# Patient Record
Sex: FEMALE | Race: WHITE | NOT HISPANIC OR LATINO | URBAN - METROPOLITAN AREA
[De-identification: names, ages, dates, MRNs, and addresses within clinical notes are randomized per-mention and may not be internally consistent; named-entity substitution may affect disease eponyms.]

---

## 2017-09-09 ENCOUNTER — EMERGENCY (EMERGENCY)
Facility: HOSPITAL | Age: 57
LOS: 1 days | Discharge: HOME | End: 2017-09-09
Attending: EMERGENCY MEDICINE | Admitting: EMERGENCY MEDICINE
Payer: COMMERCIAL

## 2017-09-09 VITALS
RESPIRATION RATE: 19 BRPM | HEART RATE: 83 BPM | TEMPERATURE: 98 F | OXYGEN SATURATION: 100 % | DIASTOLIC BLOOD PRESSURE: 116 MMHG | WEIGHT: 130.07 LBS | SYSTOLIC BLOOD PRESSURE: 136 MMHG

## 2017-09-09 VITALS
TEMPERATURE: 98 F | HEART RATE: 78 BPM | DIASTOLIC BLOOD PRESSURE: 56 MMHG | OXYGEN SATURATION: 100 % | RESPIRATION RATE: 18 BRPM | SYSTOLIC BLOOD PRESSURE: 124 MMHG

## 2017-09-09 PROCEDURE — 99284 EMERGENCY DEPT VISIT MOD MDM: CPT

## 2017-09-09 PROCEDURE — 71020: CPT | Mod: 26

## 2017-09-09 NOTE — ED PROVIDER NOTE - OBJECTIVE STATEMENT
*further documentation by resident on paper chart; pt was seen during downtime*  56 yr old female with PMH of PTX (last 20 years ago) *further documentation by resident on paper chart; pt was seen during downtime*  56 yr old female with PMH of PTX (last 20 years ago) presents with CP and SOB.  Denies cough, fever.  Denies trauma, excessive lifting.  Has had multiple stressors with sick family members that has caused excessive anxiety.  States some components make her feel this is like prior PTX but some do not.  No prior cardiac workup.

## 2017-09-09 NOTE — ED PROVIDER NOTE - MEDICAL DECISION MAKING DETAILS
US shows no PTX; EKG wnl; CXR pending;  has f/u with PMD and further hx taking appears largely related to stressors

## 2017-09-09 NOTE — DOWNTIME INTERRUPTION NOTE - WHICH MANUAL FORMS INITIATED?
sunrise was down from 10pm 9/8/17 to 12:30pm 9/9/17 sunrise was down from 10pm 9/8/17 to 12:30pm 9/9/17 please see paper chart

## 2018-01-15 ENCOUNTER — RESULT REVIEW (OUTPATIENT)
Age: 58
End: 2018-01-15

## 2019-01-22 ENCOUNTER — RESULT REVIEW (OUTPATIENT)
Age: 59
End: 2019-01-22

## 2019-06-27 NOTE — ED ADULT TRIAGE NOTE - CHIEF COMPLAINT QUOTE
DOWNTIME TRIAGE: c/o tightness in chest since Thursday. C/o pressure to left back and neck since yesterday. hx of 2 spontaneous pneumothorax. PMH central serous retinopathy Please see the result note that was sent earlier today. Thanks!

## 2020-04-17 ENCOUNTER — RESULT REVIEW (OUTPATIENT)
Age: 60
End: 2020-04-17

## 2021-08-03 ENCOUNTER — RESULT REVIEW (OUTPATIENT)
Age: 61
End: 2021-08-03

## 2021-12-28 ENCOUNTER — TRANSCRIPTION ENCOUNTER (OUTPATIENT)
Age: 61
End: 2021-12-28

## 2022-11-10 PROBLEM — J93.9 PNEUMOTHORAX, UNSPECIFIED: Chronic | Status: ACTIVE | Noted: 2017-09-18

## 2022-12-28 ENCOUNTER — APPOINTMENT (OUTPATIENT)
Dept: OBGYN | Facility: CLINIC | Age: 62
End: 2022-12-28
Payer: COMMERCIAL

## 2022-12-28 PROCEDURE — 99396 PREV VISIT EST AGE 40-64: CPT | Mod: 25

## 2022-12-28 PROCEDURE — 81002 URINALYSIS NONAUTO W/O SCOPE: CPT

## 2022-12-28 PROCEDURE — 82270 OCCULT BLOOD FECES: CPT

## 2023-12-29 ENCOUNTER — APPOINTMENT (OUTPATIENT)
Dept: OBGYN | Facility: CLINIC | Age: 63
End: 2023-12-29
Payer: COMMERCIAL

## 2023-12-29 PROCEDURE — 81002 URINALYSIS NONAUTO W/O SCOPE: CPT

## 2023-12-29 PROCEDURE — 82270 OCCULT BLOOD FECES: CPT

## 2023-12-29 PROCEDURE — 99396 PREV VISIT EST AGE 40-64: CPT

## 2024-09-23 ENCOUNTER — NON-APPOINTMENT (OUTPATIENT)
Age: 64
End: 2024-09-23

## 2024-10-14 ENCOUNTER — NON-APPOINTMENT (OUTPATIENT)
Age: 64
End: 2024-10-14

## 2024-12-06 ENCOUNTER — NON-APPOINTMENT (OUTPATIENT)
Age: 64
End: 2024-12-06

## 2025-02-21 ENCOUNTER — APPOINTMENT (OUTPATIENT)
Dept: OBGYN | Facility: CLINIC | Age: 65
End: 2025-02-21

## 2025-02-21 ENCOUNTER — RESULT REVIEW (OUTPATIENT)
Age: 65
End: 2025-02-21

## 2025-02-21 PROCEDURE — 82270 OCCULT BLOOD FECES: CPT

## 2025-02-21 PROCEDURE — 81002 URINALYSIS NONAUTO W/O SCOPE: CPT

## 2025-02-21 PROCEDURE — 99213 OFFICE O/P EST LOW 20 MIN: CPT | Mod: 25

## 2025-02-21 PROCEDURE — G0101: CPT
